# Patient Record
Sex: FEMALE | URBAN - METROPOLITAN AREA
[De-identification: names, ages, dates, MRNs, and addresses within clinical notes are randomized per-mention and may not be internally consistent; named-entity substitution may affect disease eponyms.]

---

## 2024-01-03 ENCOUNTER — OFFICE VISIT (OUTPATIENT)
Dept: URBAN - METROPOLITAN AREA CLINIC 35 | Facility: CLINIC | Age: 39
End: 2024-01-03

## 2024-01-17 ENCOUNTER — LAB OUTSIDE AN ENCOUNTER (OUTPATIENT)
Dept: URBAN - METROPOLITAN AREA CLINIC 35 | Facility: CLINIC | Age: 39
End: 2024-01-17

## 2024-01-17 ENCOUNTER — OFFICE VISIT (OUTPATIENT)
Dept: URBAN - METROPOLITAN AREA CLINIC 35 | Facility: CLINIC | Age: 39
End: 2024-01-17
Payer: COMMERCIAL

## 2024-01-17 VITALS
DIASTOLIC BLOOD PRESSURE: 70 MMHG | HEIGHT: 64 IN | WEIGHT: 131 LBS | HEART RATE: 64 BPM | BODY MASS INDEX: 22.36 KG/M2 | SYSTOLIC BLOOD PRESSURE: 100 MMHG | OXYGEN SATURATION: 98 %

## 2024-01-17 DIAGNOSIS — R10.13 DYSPEPSIA: ICD-10-CM

## 2024-01-17 PROCEDURE — 99203 OFFICE O/P NEW LOW 30 MIN: CPT | Performed by: STUDENT IN AN ORGANIZED HEALTH CARE EDUCATION/TRAINING PROGRAM

## 2024-01-17 RX ORDER — LANSOPRAZOLE 30 MG/1
1 CAPSULE BEFORE A MEAL CAPSULE, DELAYED RELEASE ORAL ONCE A DAY
Status: ACTIVE | COMMUNITY

## 2024-01-17 RX ORDER — ERGOCALCIFEROL CAPSULES, 1.25 MG/1
1 CAPSULE CAPSULE ORAL
Status: ACTIVE | COMMUNITY

## 2024-01-17 NOTE — HPI-TODAY'S VISIT:
38 year old female new patient presents today for a consultation for burning pain symptoms in abdomen x 5 years. Reports can have a month of debilitating symptoms at a time with difficulty wanting to eat. Intermittent stuck sensation with certain foods like potatoes and fries. Passes with water. No NSAID usage. Nausea when symptoms. Takes PPI with symptoms and reports partial improvements only Reports had tried 3-4x different ones including dexilant.  Reports chronic constipation. Takes Mg and controls symptoms. No blood in stools. No black stools. She reports had barium test in past and possible H.pylori test in past that was negative.   No previous history of colonoscopy/EGD. She reports having history of constipation for years since childhood. No imaging completed for symptoms at this time.  Lab 12/02/203 Vitamin B6 Plasma 97.9(H); Lipase 34.0; Amylase 80; ESR 3 Vitamin D 25-Hydroxy: 24.8(L) Folate >20; Ferritin 68.2; Iron 45; ; % Sat 14(L) Creat 0.62; Alk, Phos 81; ALT 11; AST 16; Bili, Tot <0.2 WBc 4.8; RBC 4.36; HGB 12.1;

## 2024-02-08 ENCOUNTER — LAB (OUTPATIENT)
Dept: URBAN - METROPOLITAN AREA CLINIC 4 | Facility: CLINIC | Age: 39
End: 2024-02-08
Payer: COMMERCIAL

## 2024-02-08 ENCOUNTER — EGD (OUTPATIENT)
Dept: URBAN - METROPOLITAN AREA SURGERY CENTER 8 | Facility: SURGERY CENTER | Age: 39
End: 2024-02-08
Payer: COMMERCIAL

## 2024-02-08 DIAGNOSIS — K31.89 ACHYLIA: ICD-10-CM

## 2024-02-08 DIAGNOSIS — K31.89 OTHER DISEASES OF STOMACH AND DUODENUM: ICD-10-CM

## 2024-02-08 DIAGNOSIS — R10.13 ABDOMINAL DISCOMFORT, EPIGASTRIC: ICD-10-CM

## 2024-02-08 DIAGNOSIS — R13.19 CERVICAL DYSPHAGIA: ICD-10-CM

## 2024-02-08 DIAGNOSIS — K21.9 ACID REFLUX: ICD-10-CM

## 2024-02-08 PROCEDURE — 88305 TISSUE EXAM BY PATHOLOGIST: CPT | Performed by: PATHOLOGY

## 2024-02-08 PROCEDURE — 88312 SPECIAL STAINS GROUP 1: CPT | Performed by: PATHOLOGY

## 2024-02-08 PROCEDURE — 43239 EGD BIOPSY SINGLE/MULTIPLE: CPT | Performed by: STUDENT IN AN ORGANIZED HEALTH CARE EDUCATION/TRAINING PROGRAM

## 2024-02-08 RX ORDER — ERGOCALCIFEROL CAPSULES, 1.25 MG/1
1 CAPSULE CAPSULE ORAL
Status: ACTIVE | COMMUNITY

## 2024-02-08 RX ORDER — LANSOPRAZOLE 30 MG/1
1 CAPSULE BEFORE A MEAL CAPSULE, DELAYED RELEASE ORAL ONCE A DAY
Status: ACTIVE | COMMUNITY

## 2024-02-28 ENCOUNTER — OV EP (OUTPATIENT)
Dept: URBAN - METROPOLITAN AREA CLINIC 35 | Facility: CLINIC | Age: 39
End: 2024-02-28
Payer: COMMERCIAL

## 2024-02-28 VITALS
HEART RATE: 81 BPM | HEIGHT: 64 IN | OXYGEN SATURATION: 98 % | DIASTOLIC BLOOD PRESSURE: 70 MMHG | SYSTOLIC BLOOD PRESSURE: 100 MMHG | BODY MASS INDEX: 22.36 KG/M2 | WEIGHT: 131 LBS

## 2024-02-28 DIAGNOSIS — K21.9 GASTROESOPHAGEAL REFLUX DISEASE WITHOUT ESOPHAGITIS: ICD-10-CM

## 2024-02-28 DIAGNOSIS — R10.13 DYSPEPSIA: ICD-10-CM

## 2024-02-28 PROBLEM — 266435005: Status: ACTIVE | Noted: 2024-02-28

## 2024-02-28 PROCEDURE — 99213 OFFICE O/P EST LOW 20 MIN: CPT | Performed by: STUDENT IN AN ORGANIZED HEALTH CARE EDUCATION/TRAINING PROGRAM

## 2024-02-28 RX ORDER — LANSOPRAZOLE 30 MG/1
1 CAPSULE BEFORE A MEAL CAPSULE, DELAYED RELEASE ORAL ONCE A DAY
Status: ACTIVE | COMMUNITY

## 2024-02-28 RX ORDER — ERGOCALCIFEROL CAPSULES, 1.25 MG/1
1 CAPSULE CAPSULE ORAL
Status: ACTIVE | COMMUNITY

## 2024-02-28 NOTE — HPI-TODAY'S VISIT:
38 year old female patient presetnts today for a follow-up post EGD. Stll has chronic burning sensation in epigastrium, variable degrees. Certain triggers, tomatoe, chocolate. Eating well. No nausea or vomitting. Reports chronic but milder symptoms, not greatly impacting lifestyle at present. Reports swallowing issue only with significant starchy foods otherwise no issues including meats. Intermittent heartburn. Takes lansoprazole intermittently. Taks Mg for constipation and controlls.   EGD 02/08/2024 - Normal hypopharynx. - Normal esophagus. Biopsied. - Esophagogastric landmarks identified. - Normal stomach. Biopsied. - Normal duodenal bulb and second portion of the duodenum. Biopsied  Pathology report: (A) Duodenum, Second Part (D2), Bulb, Biopsy: NO SIGNIFICANT ABNORMALITY. (B) Stomach, Antrum and Body, Biopsy: NO SIGNIFICANT ABNORMALITY. (C) Esophagus, Lower-Third, Biopsy: SQUAMOUS MUCOSA WITH REFLUX-TYPE CHANGES; NO COLUMNAR MUCOSA             IDENTIFIED. No Evidence of Martínez's Esophagus or Eosinophilic Esophagitis. Negative for Infectious organisms, Dysplasia or Malignancy. (D) Esophagus, Upper-Third, Middle Third, Biopsy: SQUAMOUS MUCOSA WITH REFLUX-TYPE CHANGES; NO COLUMNAR MUCOSA             IDENTIFIED. No Evidence of Martínez's Esophagus or Eosinophilic Esophagitis. Negative for Infectious organisms, Dysplasia or Malignancy.  Last visit 01/17/2024 38 year old female new patient presents today for a consultation for burning pain symptoms in abdomen x 5 years. Reports can have a month of debilitating symptoms at a time with difficulty wanting to eat. Intermittent stuck sensation with certain foods like potatoes and fries. Passes with water. No NSAID usage. Nausea when symptoms. Takes PPI with symptoms and reports partial improvements only Reports had tried 3-4x different ones including dexilant.  Reports chronic constipation. Takes Mg and controls symptoms. No blood in stools. No black stools. She reports had barium test in past and possible H.pylori test in past that was negative.   No previous history of colonoscopy/EGD. She reports having history of constipation for years since childhood. No imaging completed for symptoms at this time.  Lab 12/02/203 Vitamin B6 Plasma 97.9(H); Lipase 34.0; Amylase 80; ESR 3 Vitamin D 25-Hydroxy: 24.8(L) Folate >20; Ferritin 68.2; Iron 45; ; % Sat 14(L) Creat 0.62; Alk, Phos 81; ALT 11; AST 16; Bili, Tot <0.2 WBc 4.8; RBC 4.36; HGB 12.1;